# Patient Record
Sex: FEMALE | Race: BLACK OR AFRICAN AMERICAN | NOT HISPANIC OR LATINO
[De-identification: names, ages, dates, MRNs, and addresses within clinical notes are randomized per-mention and may not be internally consistent; named-entity substitution may affect disease eponyms.]

---

## 2017-06-15 ENCOUNTER — APPOINTMENT (OUTPATIENT)
Dept: PLASTIC SURGERY | Facility: CLINIC | Age: 37
End: 2017-06-15

## 2017-06-15 VITALS
HEART RATE: 93 BPM | SYSTOLIC BLOOD PRESSURE: 117 MMHG | WEIGHT: 200 LBS | BODY MASS INDEX: 28 KG/M2 | TEMPERATURE: 98.1 F | DIASTOLIC BLOOD PRESSURE: 81 MMHG | HEIGHT: 71 IN

## 2017-06-21 ENCOUNTER — FORM ENCOUNTER (OUTPATIENT)
Age: 37
End: 2017-06-21

## 2017-06-22 ENCOUNTER — APPOINTMENT (OUTPATIENT)
Dept: MRI IMAGING | Facility: CLINIC | Age: 37
End: 2017-06-22

## 2017-06-22 ENCOUNTER — OUTPATIENT (OUTPATIENT)
Dept: OUTPATIENT SERVICES | Facility: HOSPITAL | Age: 37
LOS: 1 days | End: 2017-06-22
Payer: COMMERCIAL

## 2017-06-22 DIAGNOSIS — Z00.8 ENCOUNTER FOR OTHER GENERAL EXAMINATION: ICD-10-CM

## 2017-06-22 PROCEDURE — 72157 MRI CHEST SPINE W/O & W/DYE: CPT

## 2017-06-22 PROCEDURE — A9585: CPT

## 2017-07-03 ENCOUNTER — APPOINTMENT (OUTPATIENT)
Dept: PLASTIC SURGERY | Facility: CLINIC | Age: 37
End: 2017-07-03

## 2017-07-03 DIAGNOSIS — R22.2 LOCALIZED SWELLING, MASS AND LUMP, TRUNK: ICD-10-CM

## 2017-07-05 ENCOUNTER — APPOINTMENT (OUTPATIENT)
Dept: SURGICAL ONCOLOGY | Facility: CLINIC | Age: 37
End: 2017-07-05

## 2017-07-05 DIAGNOSIS — D21.6 BENIGN NEOPLASM OF CONNECTIVE AND OTHER SOFT TISSUE OF TRUNK, UNSPECIFIED: ICD-10-CM

## 2017-07-11 ENCOUNTER — OUTPATIENT (OUTPATIENT)
Dept: OUTPATIENT SERVICES | Facility: HOSPITAL | Age: 37
LOS: 1 days | End: 2017-07-11

## 2017-07-11 VITALS
RESPIRATION RATE: 17 BRPM | WEIGHT: 199.96 LBS | HEART RATE: 82 BPM | DIASTOLIC BLOOD PRESSURE: 82 MMHG | SYSTOLIC BLOOD PRESSURE: 104 MMHG | TEMPERATURE: 98 F | HEIGHT: 71 IN | OXYGEN SATURATION: 99 %

## 2017-07-11 DIAGNOSIS — R22.2 LOCALIZED SWELLING, MASS AND LUMP, TRUNK: ICD-10-CM

## 2017-07-11 DIAGNOSIS — D17.9 BENIGN LIPOMATOUS NEOPLASM, UNSPECIFIED: ICD-10-CM

## 2017-07-11 DIAGNOSIS — Z90.49 ACQUIRED ABSENCE OF OTHER SPECIFIED PARTS OF DIGESTIVE TRACT: Chronic | ICD-10-CM

## 2017-07-11 LAB
HCT VFR BLD CALC: 36.6 % — SIGNIFICANT CHANGE UP (ref 34.5–45)
HGB BLD-MCNC: 12.5 G/DL — SIGNIFICANT CHANGE UP (ref 11.5–15.5)
MCHC RBC-ENTMCNC: 31.1 PG — SIGNIFICANT CHANGE UP (ref 27–34)
MCHC RBC-ENTMCNC: 34.2 % — SIGNIFICANT CHANGE UP (ref 32–36)
MCV RBC AUTO: 91 FL — SIGNIFICANT CHANGE UP (ref 80–100)
NRBC # FLD: 0 — SIGNIFICANT CHANGE UP
PLATELET # BLD AUTO: 230 K/UL — SIGNIFICANT CHANGE UP (ref 150–400)
PMV BLD: 13 FL — SIGNIFICANT CHANGE UP (ref 7–13)
RBC # BLD: 4.02 M/UL — SIGNIFICANT CHANGE UP (ref 3.8–5.2)
RBC # FLD: 13 % — SIGNIFICANT CHANGE UP (ref 10.3–14.5)
WBC # BLD: 9.6 K/UL — SIGNIFICANT CHANGE UP (ref 3.8–10.5)
WBC # FLD AUTO: 9.6 K/UL — SIGNIFICANT CHANGE UP (ref 3.8–10.5)

## 2017-07-11 NOTE — H&P PST ADULT - FAMILY HISTORY
Mother  Still living? Yes, Estimated age: 71-80  Family history of diabetes mellitus, Age at diagnosis: Age Unknown  Family history of hypertension, Age at diagnosis: Age Unknown     Father  Still living? Yes, Estimated age: 71-80  Family history of hypertension, Age at diagnosis: Age Unknown  Family history of prostate cancer, Age at diagnosis: Age Unknown

## 2017-07-11 NOTE — H&P PST ADULT - NSANTHOSAYNRD_GEN_A_CORE
No. AV screening performed.  STOP BANG Legend: 0-2 = LOW Risk; 3-4 = INTERMEDIATE Risk; 5-8 = HIGH Risk

## 2017-07-11 NOTE — H&P PST ADULT - PMH
Infertility, female    Lipoma    Sickle cell trait Anemia  denies any transfusions, count stable, does not require Iron  Infertility, female    Lipoma    Sickle cell trait

## 2017-07-11 NOTE — H&P PST ADULT - PROBLEM SELECTOR PLAN 1
Pt. is scheduled for resection of soft tissue mass of upper back on 7/19/17.  Preoperative instructions reviewed, pt. verbalized understanding.  Preop Famotidine and Chlorhexidine provided.  Lab results pending

## 2017-07-11 NOTE — H&P PST ADULT - ATTENDING COMMENTS
R/B/A discussed with patient, she expressed understanding and agrees to proceed with resection of mid back soft tissue mass.

## 2017-07-11 NOTE — H&P PST ADULT - RS GEN PE MLT RESP DETAILS PC
respirations non-labored/breath sounds equal/airway patent/good air movement/clear to auscultation bilaterally

## 2017-07-11 NOTE — H&P PST ADULT - HISTORY OF PRESENT ILLNESS
36 y/o female with a lipoma presents to PAST today for presurgical evalution.  She noticed the lipoma approx 7 years ago and it has grown during pregnancy 2014.  Since that time it is causing pain with ROM and at night sleeping. 38 y/o female with a lipoma presents to PAST today for presurgical evalution.  She noticed the lipoma approx 7 years but reports that it has grown during pregnancy 2014 to 2015.  Since that time it has been causing pain with ROM and at night sleeping. She is scheduled for resection soft tissue mass of upper back on 7/19/17.

## 2017-07-19 ENCOUNTER — OUTPATIENT (OUTPATIENT)
Dept: OUTPATIENT SERVICES | Facility: HOSPITAL | Age: 37
LOS: 1 days | Discharge: HOME | End: 2017-07-19
Payer: COMMERCIAL

## 2017-07-19 ENCOUNTER — APPOINTMENT (OUTPATIENT)
Dept: SURGICAL ONCOLOGY | Facility: AMBULATORY SURGERY CENTER | Age: 37
End: 2017-07-19

## 2017-07-19 ENCOUNTER — RESULT REVIEW (OUTPATIENT)
Age: 37
End: 2017-07-19

## 2017-07-19 VITALS
RESPIRATION RATE: 16 BRPM | DIASTOLIC BLOOD PRESSURE: 91 MMHG | SYSTOLIC BLOOD PRESSURE: 130 MMHG | OXYGEN SATURATION: 100 % | HEART RATE: 61 BPM | TEMPERATURE: 97 F

## 2017-07-19 VITALS
TEMPERATURE: 36 F | SYSTOLIC BLOOD PRESSURE: 112 MMHG | DIASTOLIC BLOOD PRESSURE: 73 MMHG | OXYGEN SATURATION: 100 % | RESPIRATION RATE: 16 BRPM | HEIGHT: 70.87 IN | WEIGHT: 199.96 LBS | HEART RATE: 71 BPM

## 2017-07-19 DIAGNOSIS — R22.2 LOCALIZED SWELLING, MASS AND LUMP, TRUNK: ICD-10-CM

## 2017-07-19 DIAGNOSIS — Z90.49 ACQUIRED ABSENCE OF OTHER SPECIFIED PARTS OF DIGESTIVE TRACT: Chronic | ICD-10-CM

## 2017-07-19 PROCEDURE — 12032 INTMD RPR S/A/T/EXT 2.6-7.5: CPT | Mod: 59

## 2017-07-19 PROCEDURE — 88304 TISSUE EXAM BY PATHOLOGIST: CPT | Mod: 26

## 2017-07-19 PROCEDURE — 21931 EXC BACK LES SC 3 CM/>: CPT

## 2017-07-21 ENCOUNTER — TRANSCRIPTION ENCOUNTER (OUTPATIENT)
Age: 37
End: 2017-07-21

## 2017-08-01 ENCOUNTER — APPOINTMENT (OUTPATIENT)
Dept: SURGICAL ONCOLOGY | Facility: CLINIC | Age: 37
End: 2017-08-01
Payer: COMMERCIAL

## 2017-08-01 VITALS
WEIGHT: 200 LBS | DIASTOLIC BLOOD PRESSURE: 77 MMHG | BODY MASS INDEX: 28 KG/M2 | SYSTOLIC BLOOD PRESSURE: 118 MMHG | HEART RATE: 92 BPM | OXYGEN SATURATION: 100 % | HEIGHT: 71 IN | RESPIRATION RATE: 15 BRPM

## 2017-08-01 PROCEDURE — 99024 POSTOP FOLLOW-UP VISIT: CPT

## 2018-02-08 ENCOUNTER — TRANSCRIPTION ENCOUNTER (OUTPATIENT)
Age: 38
End: 2018-02-08

## 2019-11-24 ENCOUNTER — TRANSCRIPTION ENCOUNTER (OUTPATIENT)
Age: 39
End: 2019-11-24

## 2020-01-24 PROBLEM — N97.9 FEMALE INFERTILITY, UNSPECIFIED: Chronic | Status: ACTIVE | Noted: 2017-07-11

## 2020-01-24 PROBLEM — D64.9 ANEMIA, UNSPECIFIED: Chronic | Status: ACTIVE | Noted: 2017-07-11

## 2020-01-24 PROBLEM — D57.3 SICKLE-CELL TRAIT: Chronic | Status: ACTIVE | Noted: 2017-07-11

## 2020-01-24 PROBLEM — D17.9 BENIGN LIPOMATOUS NEOPLASM, UNSPECIFIED: Chronic | Status: ACTIVE | Noted: 2017-07-11

## 2020-01-29 ENCOUNTER — APPOINTMENT (OUTPATIENT)
Dept: GYNECOLOGIC ONCOLOGY | Facility: CLINIC | Age: 40
End: 2020-01-29
Payer: COMMERCIAL

## 2020-01-29 DIAGNOSIS — N83.209 UNSPECIFIED OVARIAN CYST, UNSPECIFIED SIDE: ICD-10-CM

## 2020-01-29 DIAGNOSIS — N70.11 CHRONIC SALPINGITIS: ICD-10-CM

## 2020-01-29 DIAGNOSIS — Z87.19 PERSONAL HISTORY OF OTHER DISEASES OF THE DIGESTIVE SYSTEM: ICD-10-CM

## 2020-01-29 PROCEDURE — 76830 TRANSVAGINAL US NON-OB: CPT | Mod: 59

## 2020-01-29 PROCEDURE — 76857 US EXAM PELVIC LIMITED: CPT | Mod: 59

## 2020-01-29 PROCEDURE — 99204 OFFICE O/P NEW MOD 45 MIN: CPT | Mod: 25

## 2020-01-31 NOTE — END OF VISIT
[FreeTextEntry3] : Written by Paloma Herrera PA-C, acting as a scribe for Dr. Dylan Thorpe.\par This note accurately reflects the work and decisions made by me.\par

## 2020-01-31 NOTE — PHYSICAL EXAM
[Abnormal] : Cervix: Abnormal [Normal] : Parametria: Normal [de-identified] : small amount of white discharge from cervix, no cervical motion tenderness [de-identified] : Patient was seen and examined with chaperone Paloma Herrera PA-C.

## 2020-01-31 NOTE — ASSESSMENT
[FreeTextEntry1] : 39 year old female with hydrosalpinx and ovarian cyst with normal tumor markers. We discussed that hydrosalpinx may be due to previous surgery causing scar tissue in fallopian tube. At this time patient reports no pain, therefore I am not recommending surgical intervention. We discussed the two scenarios that would warrant surgical interventions in my opinion which include persistent pain or if patient is planning to undergo IVF. Patient states she is not planning for IVF in the future. I gave the patient the option to return to the office in 3 months for a repeat US to reevaluate hydrosalpinx and ovarian cyst, however she does not live close by and would rather follow up with Dr. Smallwood. One swab GC/CT sent today.

## 2020-01-31 NOTE — HISTORY OF PRESENT ILLNESS
[FreeTextEntry1] : 39 year old  via IVF,  LMP 20 referred by Dr. Smallwood. Patient had been being monitored with US for ovarian cysts, but she had not experienced pain until recently. Patient reports she presented to [facility] with severe right sided pelvic pain which came on suddenly, she had CT and US performed there and was told her fallopian tube was swollen and she had many ovarian cysts. Pt had MRI 20 results as follows: right ovary with simple ovarian cysts measuring up to 2.5 cm right hydrosalpinx, no suspicious adnexal mass. Ca125, Inhibin A, B and SHLOMO all WNL. Patient reports she had been monitored with US for known ovarian cysts. Primary GYN discussed surgical options with patient. Denies history of STD's in the past. Patient reports she had been placed on OCP 2019 for management of ovarian cysts but due to weight gain she stopped in December. She reports she is  with one heterosexual partner. Denies pelvic pain at this time, unexpected weight loss, change in bowel/bladder habits, pain with intercourse or post coital bleeding.\par \par Patient reports when she was younger she had appendicitis which was complicated by peritonitis and she underwent abdominal surgery. \par \par Lpap: 2019 WNL per pt\par Lmammo: 4 years ago WNL per pt

## 2020-01-31 NOTE — CHIEF COMPLAINT
[FreeTextEntry1] : [facility]\par [place] [facility]\par [address]\[place] [address]\par

## 2020-02-12 DIAGNOSIS — A74.9 CHLAMYDIAL INFECTION, UNSPECIFIED: ICD-10-CM

## 2020-02-12 RX ORDER — DOXYCYCLINE HYCLATE 100 MG/1
100 TABLET ORAL
Qty: 14 | Refills: 0 | Status: ACTIVE | COMMUNITY
Start: 2020-02-12 | End: 1900-01-01

## 2020-04-12 ENCOUNTER — TRANSCRIPTION ENCOUNTER (OUTPATIENT)
Age: 40
End: 2020-04-12

## 2021-05-25 ENCOUNTER — TRANSCRIPTION ENCOUNTER (OUTPATIENT)
Age: 41
End: 2021-05-25

## 2022-01-01 ENCOUNTER — TRANSCRIPTION ENCOUNTER (OUTPATIENT)
Age: 42
End: 2022-01-01

## 2022-05-06 ENCOUNTER — NON-APPOINTMENT (OUTPATIENT)
Age: 42
End: 2022-05-06

## 2024-03-31 ENCOUNTER — NON-APPOINTMENT (OUTPATIENT)
Age: 44
End: 2024-03-31

## 2024-11-13 ENCOUNTER — NON-APPOINTMENT (OUTPATIENT)
Age: 44
End: 2024-11-13